# Patient Record
Sex: FEMALE | Race: BLACK OR AFRICAN AMERICAN | Employment: STUDENT | URBAN - METROPOLITAN AREA
[De-identification: names, ages, dates, MRNs, and addresses within clinical notes are randomized per-mention and may not be internally consistent; named-entity substitution may affect disease eponyms.]

---
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
## 2022-11-29 ENCOUNTER — NURSE ONLY (OUTPATIENT)
Dept: PRIMARY CARE CLINIC | Age: 22
End: 2022-11-29

## 2022-11-29 DIAGNOSIS — Z11.1 PPD SCREENING TEST: ICD-10-CM

## 2022-11-29 DIAGNOSIS — Z01.84 IMMUNITY STATUS TESTING: ICD-10-CM

## 2022-11-29 DIAGNOSIS — Z01.84 IMMUNITY STATUS TESTING: Primary | ICD-10-CM

## 2022-11-29 PROCEDURE — 86580 TB INTRADERMAL TEST: CPT | Performed by: NURSE PRACTITIONER

## 2022-11-30 LAB — HBV SURFACE AB TITR SER: NORMAL {TITER}

## 2022-12-01 ENCOUNTER — OFFICE VISIT (OUTPATIENT)
Dept: PRIMARY CARE CLINIC | Age: 22
End: 2022-12-01

## 2022-12-01 DIAGNOSIS — Z11.1 PPD SCREENING TEST: Primary | ICD-10-CM

## 2022-12-01 LAB
INDURATION: NORMAL
MEASLES IMMUNE (IGG): NORMAL
MUMPS AB IGG: NORMAL
RUBELLA ANTIBODY IGG: NORMAL
TB SKIN TEST: NORMAL
VARICELLA-ZOSTER VIRUS AB, IGG: NORMAL

## 2022-12-03 LAB — MISCELLANEOUS LAB TEST RESULT: NORMAL

## 2022-12-13 ENCOUNTER — NURSE ONLY (OUTPATIENT)
Dept: PRIMARY CARE CLINIC | Age: 22
End: 2022-12-13
Payer: COMMERCIAL

## 2022-12-13 DIAGNOSIS — Z23 NEED FOR VARICELLA VACCINE: ICD-10-CM

## 2022-12-13 DIAGNOSIS — Z23 NEED FOR MMR VACCINE: Primary | ICD-10-CM

## 2022-12-13 DIAGNOSIS — Z23 NEED FOR HEPATITIS B VACCINATION: Primary | ICD-10-CM

## 2022-12-13 PROCEDURE — 90739 HEPB VACC 2/4 DOSE ADULT IM: CPT | Performed by: FAMILY MEDICINE

## 2022-12-13 PROCEDURE — 90707 MMR VACCINE SC: CPT | Performed by: FAMILY MEDICINE

## 2022-12-13 PROCEDURE — 90472 IMMUNIZATION ADMIN EACH ADD: CPT | Performed by: FAMILY MEDICINE

## 2022-12-13 PROCEDURE — 90716 VAR VACCINE LIVE SUBQ: CPT | Performed by: FAMILY MEDICINE

## 2022-12-13 PROCEDURE — 90471 IMMUNIZATION ADMIN: CPT | Performed by: FAMILY MEDICINE

## 2023-01-11 ENCOUNTER — NURSE ONLY (OUTPATIENT)
Dept: PRIMARY CARE CLINIC | Age: 23
End: 2023-01-11

## 2023-01-11 ENCOUNTER — NURSE ONLY (OUTPATIENT)
Dept: PRIMARY CARE CLINIC | Age: 23
End: 2023-01-11
Payer: COMMERCIAL

## 2023-01-11 DIAGNOSIS — Z23 NEED FOR MEASLES-MUMPS-RUBELLA (MMR) VACCINE: Primary | ICD-10-CM

## 2023-01-11 DIAGNOSIS — Z23 NEED FOR HEPATITIS B VACCINATION: Primary | ICD-10-CM

## 2023-01-11 PROCEDURE — 90739 HEPB VACC 2/4 DOSE ADULT IM: CPT | Performed by: NURSE PRACTITIONER

## 2023-01-11 PROCEDURE — 90707 MMR VACCINE SC: CPT | Performed by: NURSE PRACTITIONER

## 2023-01-11 PROCEDURE — 90471 IMMUNIZATION ADMIN: CPT | Performed by: NURSE PRACTITIONER

## 2023-01-17 ENCOUNTER — NURSE ONLY (OUTPATIENT)
Dept: PRIMARY CARE CLINIC | Age: 23
End: 2023-01-17
Payer: COMMERCIAL

## 2023-01-17 DIAGNOSIS — Z23 NEED FOR VARICELLA VACCINE: Primary | ICD-10-CM

## 2023-01-17 PROCEDURE — 90471 IMMUNIZATION ADMIN: CPT | Performed by: NURSE PRACTITIONER

## 2023-01-17 PROCEDURE — 90716 VAR VACCINE LIVE SUBQ: CPT | Performed by: NURSE PRACTITIONER

## 2023-02-08 ENCOUNTER — OFFICE VISIT (OUTPATIENT)
Dept: PRIMARY CARE CLINIC | Age: 23
End: 2023-02-08
Payer: COMMERCIAL

## 2023-02-08 VITALS
WEIGHT: 156.2 LBS | SYSTOLIC BLOOD PRESSURE: 120 MMHG | RESPIRATION RATE: 18 BRPM | HEIGHT: 63 IN | OXYGEN SATURATION: 99 % | BODY MASS INDEX: 27.68 KG/M2 | DIASTOLIC BLOOD PRESSURE: 71 MMHG | TEMPERATURE: 97.7 F | HEART RATE: 93 BPM

## 2023-02-08 DIAGNOSIS — J02.9 SORE THROAT: ICD-10-CM

## 2023-02-08 DIAGNOSIS — U07.1 COVID-19: Primary | ICD-10-CM

## 2023-02-08 LAB
INFLUENZA A ANTIBODY: NORMAL
INFLUENZA B ANTIBODY: NORMAL
Lab: ABNORMAL
PERFORMING INSTRUMENT: ABNORMAL
QC PASS/FAIL: ABNORMAL
S PYO AG THROAT QL: NORMAL
SARS-COV-2, POC: DETECTED

## 2023-02-08 PROCEDURE — 87426 SARSCOV CORONAVIRUS AG IA: CPT | Performed by: NURSE PRACTITIONER

## 2023-02-08 PROCEDURE — 87804 INFLUENZA ASSAY W/OPTIC: CPT | Performed by: NURSE PRACTITIONER

## 2023-02-08 PROCEDURE — 99213 OFFICE O/P EST LOW 20 MIN: CPT | Performed by: NURSE PRACTITIONER

## 2023-02-08 PROCEDURE — 87880 STREP A ASSAY W/OPTIC: CPT | Performed by: NURSE PRACTITIONER

## 2023-02-08 NOTE — LETTER
[facility] [place]  [address] [place] [address]  Dept: [phone]  Dept Fax: [phone]: [phone]    KAVON Velazco CNP      2/8/2023     Patient: Bernadette Ferrer   YOB: 2000       To Whom It May Concern: It is my medical opinion that Bernadette Ferrer should remain out of school/work while acutely ill. She did test positive for COVID-19, CDC guidelines recommend 5-day isolation from symptom onset. Patient can return to school/work after 5 days of symptoms if there is no fever for 24 hours without fever reducing medications and resolving symptoms. Pt should continue to wear tight fitting mask for 5 additional days after isolation is over. If you have any questions or concerns, please don't hesitate to call.     Sincerely,        KAVON Velazco CNP

## 2023-02-08 NOTE — PROGRESS NOTES
Chief Complaint   Pharyngitis (Started yesterday, as well as runny nose, fatigue, back pain/body aches & cough. )    History of Present Illness   Source of history provided by:  patient. Jose Acosta is a [address] y.o. old female who presents to walk-in with complaints of Chills, Generalized Body Aches, Pharyngitis, Rhinorrhea, Nasal Congestion, and dry Cough x 1 days. States symptoms have been unchanged since onset. Has been taking Dayquil without symptomatic relief. Currently denies any Fever, Shortness of breath, Nausea, Vomiting, Chest Pain, Abdominal Pain, Rash, Lethargy, or Close contact with a lab confirmed COVID-19 patient within 14 days of symptom onset . Reports hx of sports induced asthma. Denies tobacco use. Patient denies history of COVID-19. Patient is  vaccinated for COVID-19. ROS   Pertinent positives and negatives are stated within HPI, all other systems reviewed and are negative. Past Medical History:  has no past medical history on file. Past Surgical History:  has no past surgical history on file. Social History:  reports that she has been smoking cigarettes. She has never used smokeless tobacco.  Family History: family history is not on file. Allergies: Patient has no known allergies. Physical Exam   Vital Signs:  /71   Pulse 93   Temp 97.7 °F (36.5 °C) (Oral)   Resp 18   Ht 5' 3\" (1.6 m)   Wt 156 lb 3.2 oz (70.9 kg)   LMP 01/25/2023 (Exact Date)   SpO2 99%   BMI 27.67 kg/m²    Oxygen Saturation Interpretation: Normal.    Constitutional:  Alert, development consistent with age. NAD. Head:  NC/NT. Airway patent. Ears: TMs clear bilaterally. Canals without exudate or swelling bilaterally. Mouth: Posterior pharynx with mild erythema and clear postnasal drip. There is no tonsillar hypertrophy or exudate. Neck:  Normal ROM. Supple. There is no anterior cervical adenopathy noted. Lungs: CTAB without wheezes, rales, or rhonchi.    CV:  Regular rate and rhythm, normal heart sounds, without pathological murmurs, ectopy, gallops, or rubs. Skin:  Normal turgor. Warm, dry, without visible rash. Lymphatic: No lymphangitis or adenopathy noted. Neurological:  Oriented. Motor functions intact. Lab / Imaging Results   (All laboratory and radiology results have been personally reviewed by myself)  Labs:  Results for orders placed or performed in visit on 02/08/23   POCT COVID-19, Antigen   Result Value Ref Range    SARS-COV-2, POC Detected (A) Not Detected    Lot Number [id number]     QC Pass/Fail pass     Performing Instrument BinaxNOW    POCT Influenza A/B   Result Value Ref Range    Influenza A Ab NEG     Influenza B Ab NEG    POCT rapid strep A   Result Value Ref Range    Strep A Ag None Detected None Detected     Imaging: All Radiology results interpreted by Radiologist unless otherwise noted. No results found. Medical Decision Making   Pt non-toxic, in no apparent distress and stable at time of discharge. Assessment/Plan   Emelyn Fong was seen today for pharyngitis. Diagnoses and all orders for this visit:    COVID-19  -     POCT COVID-19, Antigen  -     POCT Influenza A/B    Sore throat  -     POCT rapid strep A    Rapid COVID-19 positive in office, pt advised of results. Pt advised of current CDC guidelines regarding isolation. Also advised current criteria to d/c isolation. Increase fluids and rest. Symptomatic relief discussed including Tylenol prn pain/fever. Schedule f/u with PCP in 7-10 days if symptoms persist. ED sooner if symptoms worsen or change. ED immediately with high or refractory fever, progressive SOB, dyspnea, CP, calf pain/swelling, shaking chills, vomiting, abdominal pain, lethargy, flank pain, or decreased urinary output. Pt verbalizes understanding and is in agreement with plan of care. All questions answered. Return if symptoms worsen or fail to improve.      Electronically signed by KAVON Banks CNP   DD: 2/8/23    **This report was transcribed using voice recognition software. Every effort was made to ensure accuracy; however, inadvertent computerized transcription errors may be present.

## 2023-02-26 ENCOUNTER — HOSPITAL ENCOUNTER (EMERGENCY)
Age: 23
Discharge: HOME | End: 2023-02-26
Payer: COMMERCIAL

## 2023-02-26 VITALS
DIASTOLIC BLOOD PRESSURE: 73 MMHG | RESPIRATION RATE: 16 BRPM | OXYGEN SATURATION: 100 % | SYSTOLIC BLOOD PRESSURE: 112 MMHG | HEART RATE: 88 BPM | TEMPERATURE: 97.6 F

## 2023-02-26 DIAGNOSIS — J02.0 STREP PHARYNGITIS: Primary | ICD-10-CM

## 2023-02-26 LAB — STREP GRP A PCR: POSITIVE

## 2023-02-26 PROCEDURE — 87880 STREP A ASSAY W/OPTIC: CPT

## 2023-02-26 PROCEDURE — 99283 EMERGENCY DEPT VISIT LOW MDM: CPT

## 2023-02-26 RX ORDER — AMOXICILLIN 500 MG/1
500 CAPSULE ORAL 3 TIMES DAILY
Qty: 21 CAPSULE | Refills: 0 | Status: SHIPPED | OUTPATIENT
Start: 2023-02-26 | End: 2023-03-05

## 2023-02-26 NOTE — ED PROVIDER NOTES
Independent [facility] Visit. HPI: Howard Miranda is a 23 y.o. female with a past medical history of  has no past medical history on file. presenting with complaints of a sore throat The patient states that these symptoms began gradually. The history is obtained from the patient. The patient states that he has had some subjective chills at home. Patient does complain of a mild cough associated with it that is nonproductive. Patient denies excessive fatigue or sleeping greater than 18 hours a day. Patient denies exposure to mononucleosis. The patient denies any abdominal pain, left upper quadrant fullness, or early satiety. The patient also denies difficulty breathing, hemoptysis, neck pain/stiffness, or blurry vision. Sx have persisted and are mildly worse which is what prompted the visit today. unknown exposure to sick contacts. Presents for complaints of a sore throat which she states began 3 days ago. Denies any fever, body aches or chills. Denies any chest pain or shortness of breath. Has a able to tolerate oral fluids without difficulty. ROS:   Pertinent positives and negatives are stated within HPI, all other systems reviewed and are negative.      --------------------------------------------- PAST HISTORY ---------------------------------------------  Past Medical History:  has no past medical history on file. Past Surgical History:  has no past surgical history on file. Social History:  reports that she has been smoking cigarettes. She has never used smokeless tobacco.    Family History: family history is not on file. The patients home medications have been reviewed. Allergies: Patient has no known allergies. ------------------------- NURSING NOTES AND VITALS REVIEWED ---------------------------   The nursing notes within the ED encounter and vital signs as below have been reviewed by myself.   /73   Pulse 88   Temp 97.6 °F (36.4 °C)   Resp 16   SpO2 100%   Oxygen Saturation Interpretation: Normal    The patients available past medical records and past encounters were reviewed. Physical exam:  Constitutional: Vital signs are reviewed the patient is comfortable. The patient is alert and oriented and conversant. Head: The head is atraumatic and normocephalic. Eyes: No discharge is present from the eyes. The sclera are normal.  ENT: The oropharynx demonstrates a small amount of erythema bilaterally. There is no tonsillar enlargement nor is there any exudate present. No uvular deviation or edema. No tonsillary asymmetry. Floor of the mouth soft, no trismus, handling secretions. TMs bilaterally demonstrate no evidence of infection. Neck: Normal range of motion is achieved in the neck. There is no JVD present. No meningeal signs are present   Anterior cervical adenopathy is normal.  Respiratory/chest: The chest is nontender. Breath sounds are normal. There is no respiratory distress. Cardiovascular: Heart shows a regular rate and rhythm without murmurs clicks or gallops. Abdominal exam: The abdomen is non tender without evidence of peritoneal signs. Specific attention to the left upper quadrant with palpation of the spleen demonstrates no organomegaly or tenderness  Skin: warm and dry, without rash  Neurologic: GCS 15   Psych: Normal Affect  -------------------------------------------------- RESULTS -------------------------------------------------    LABS:  Results for orders placed or performed during the hospital encounter of 02/26/23   Strep Screen Group A Throat    Specimen: Throat   Result Value Ref Range    Strep Grp A PCR POSITIVE Negative       RADIOLOGY:  Interpreted by Radiologist.  No orders to display         ------------------------------ ED COURSE/MEDICAL DECISION MAKING----------------------  Medications - No data to display          Medical Decision Making:        Presents for complaints of a sore throat which she states began 3 days ago.   Denies any fever, body aches or chills. Denies any chest pain or shortness of breath. Has a able to tolerate oral fluids without difficulty. Strep is positive will be treated with antibiotics patient advised to continue oral hydration and follow-up with PCP if any change or worsening symptoms return back to the emergency room. Not hypoxic, nothing to suggests pneumonia. Well appearing, non toxic, appropriate for outpatient management. Plan is for symptom management and PCP follow up. This patient's ED course included: a personal history and physicial eaxmination and re-evaluation prior to disposition    This patient has remained hemodynamically stable during their ED course. Counseling: The emergency provider has spoken with the patient and discussed todays results, in addition to providing specific details for the plan of care and counseling regarding the diagnosis and prognosis. Questions are answered at this time and they are agreeable with the plan.       --------------------------------- IMPRESSION AND DISPOSITION ---------------------------------    IMPRESSION  1.  Strep pharyngitis        DISPOSITION  Disposition: Discharge to home  Patient condition is good             KAVON Byrd CNP  02/26/23 [id number]

## 2023-02-26 NOTE — Clinical Note
Glenn Caro was seen and treated in our emergency department on 2/26/2023. She may return to work on 03/01/2023. If you have any questions or concerns, please don't hesitate to call.       Monique Menard, APRN - CNP

## 2023-03-02 ENCOUNTER — NURSE ONLY (OUTPATIENT)
Dept: PRIMARY CARE CLINIC | Age: 23
End: 2023-03-02

## 2023-03-02 DIAGNOSIS — Z01.84 IMMUNITY STATUS TESTING: ICD-10-CM

## 2023-03-02 DIAGNOSIS — Z01.84 IMMUNITY STATUS TESTING: Primary | ICD-10-CM

## 2023-03-02 PROCEDURE — 36415 COLL VENOUS BLD VENIPUNCTURE: CPT | Performed by: FAMILY MEDICINE

## 2023-03-03 LAB
HBV SURFACE AB TITR SER: REACTIVE {TITER}
VARICELLA-ZOSTER VIRUS AB, IGG: NORMAL

## 2023-03-06 LAB
MEASLES IMMUNE (IGG): NORMAL
MUMPS AB IGG: NORMAL
RUBELLA ANTIBODY IGG: NORMAL

## 2023-04-05 ENCOUNTER — OFFICE VISIT (OUTPATIENT)
Dept: PRIMARY CARE CLINIC | Age: 23
End: 2023-04-05

## 2023-04-05 VITALS
WEIGHT: 158 LBS | DIASTOLIC BLOOD PRESSURE: 69 MMHG | BODY MASS INDEX: 28 KG/M2 | HEIGHT: 63 IN | SYSTOLIC BLOOD PRESSURE: 110 MMHG | HEART RATE: 80 BPM | RESPIRATION RATE: 20 BRPM | TEMPERATURE: 97.8 F | OXYGEN SATURATION: 99 %

## 2023-04-05 DIAGNOSIS — R39.9 SYMPTOMS OF URINARY TRACT INFECTION: Primary | ICD-10-CM

## 2023-04-05 DIAGNOSIS — R39.9 SYMPTOMS OF URINARY TRACT INFECTION: ICD-10-CM

## 2023-04-05 LAB
BILIRUBIN, POC: NEGATIVE
BLOOD URINE, POC: NEGATIVE
CLARITY, POC: NORMAL
COLOR, POC: YELLOW
GLUCOSE URINE, POC: NEGATIVE
KETONES, POC: NORMAL
LEUKOCYTE EST, POC: NORMAL
NITRITE, POC: NEGATIVE
PH, POC: 6
PROTEIN, POC: NORMAL
SPECIFIC GRAVITY, POC: 1.03
UROBILINOGEN, POC: NORMAL

## 2023-04-05 PROCEDURE — 81002 URINALYSIS NONAUTO W/O SCOPE: CPT | Performed by: NURSE PRACTITIONER

## 2023-04-05 PROCEDURE — 99213 OFFICE O/P EST LOW 20 MIN: CPT | Performed by: NURSE PRACTITIONER

## 2023-04-05 RX ORDER — NITROFURANTOIN 25; 75 MG/1; MG/1
100 CAPSULE ORAL 2 TIMES DAILY
Qty: 10 CAPSULE | Refills: 0 | Status: SHIPPED | OUTPATIENT
Start: 2023-04-05 | End: 2023-04-10

## 2023-04-05 NOTE — PROGRESS NOTES
as wait and watch, side effects discussed. Increase fluids and rest. F/u PCP in 3-5 days if symptoms persist. ED sooner if symptoms worsen or change. ED immediately with the development of fever, shaking chills, body aches, flank pain, vomiting, CP, or SOB. Pt is in agreement with this care plan. All questions answered. Return if symptoms worsen or fail to improve. Electronically signed by KAVON Shearer CNP   DD: 4/5/23    **This report was transcribed using voice recognition software. Every effort was made to ensure accuracy; however, inadvertent computerized transcription errors may be present.

## 2023-04-07 ENCOUNTER — OFFICE VISIT (OUTPATIENT)
Dept: PRIMARY CARE CLINIC | Age: 23
End: 2023-04-07

## 2023-04-07 VITALS
OXYGEN SATURATION: 97 % | HEIGHT: 63 IN | DIASTOLIC BLOOD PRESSURE: 74 MMHG | TEMPERATURE: 99 F | RESPIRATION RATE: 20 BRPM | BODY MASS INDEX: 28 KG/M2 | SYSTOLIC BLOOD PRESSURE: 114 MMHG | HEART RATE: 98 BPM | WEIGHT: 158 LBS

## 2023-04-07 DIAGNOSIS — Z20.2 POSSIBLE EXPOSURE TO STD: Primary | ICD-10-CM

## 2023-04-07 DIAGNOSIS — R10.2 VAGINAL PAIN: ICD-10-CM

## 2023-04-07 DIAGNOSIS — Z20.2 POSSIBLE EXPOSURE TO STD: ICD-10-CM

## 2023-04-07 LAB
BILIRUBIN, POC: NEGATIVE
BLOOD URINE, POC: NORMAL
CLARITY, POC: NORMAL
COLOR, POC: YELLOW
GLUCOSE URINE, POC: NEGATIVE
KETONES, POC: NEGATIVE
LEUKOCYTE EST, POC: NEGATIVE
NITRITE, POC: NEGATIVE
PH, POC: 6
PROTEIN, POC: NEGATIVE
SPECIFIC GRAVITY, POC: 1.02
UROBILINOGEN, POC: NORMAL

## 2023-04-07 RX ORDER — AZITHROMYCIN 250 MG/1
1000 TABLET, FILM COATED ORAL ONCE
Status: COMPLETED | OUTPATIENT
Start: 2023-04-07 | End: 2023-04-07

## 2023-04-07 RX ORDER — METRONIDAZOLE 500 MG/1
500 TABLET ORAL 2 TIMES DAILY
Qty: 14 TABLET | Refills: 0 | Status: SHIPPED | OUTPATIENT
Start: 2023-04-07 | End: 2023-04-14

## 2023-04-07 RX ORDER — CEFTRIAXONE 500 MG/1
500 INJECTION, POWDER, FOR SOLUTION INTRAMUSCULAR; INTRAVENOUS ONCE
Status: COMPLETED | OUTPATIENT
Start: 2023-04-07 | End: 2023-04-07

## 2023-04-07 RX ADMIN — AZITHROMYCIN 1000 MG: 250 TABLET, FILM COATED ORAL at 15:46

## 2023-04-07 RX ADMIN — CEFTRIAXONE 500 MG: 500 INJECTION, POWDER, FOR SOLUTION INTRAMUSCULAR; INTRAVENOUS at 15:55

## 2023-04-07 NOTE — PROGRESS NOTES
Chief Complaint:   Urinary Tract Infection (Patient states she returned to office d/t her symptoms worsening. States she is now experiencing constant vaginal pain even while sitting. States she has been taking the Macrobid. )      History of Present Illness   Source of history provided by:  patient      Lamar Bruce is a 23 y.o. old female presenting to walk-in for concerns for sexually transmitted infection, which occurred prior to arrival.  Since onset the symptoms have been constant and mild-moderate in severity. Symptoms are associated with  burning on her labia's when she urinates  and denies any abdominal pain, urinary frequency, or vaginal itching. STD History: no history of PID, STD's. LMP: On BC and does not have them   Current pregnancy: No.     Birth Control: OCP (estrogen/progesterone). Gravid Status:   0 , Para 0 , Misc/ 0. Review of Systems    Unless otherwise stated in this report or unable to obtain because of the patient's clinical or mental status as evidenced by the medical record, this patients's positive and negative responses for Review of Systems, constitutional, psych, eyes, ENT, cardiovascular, respiratory, gastrointestinal, neurological, genitourinary, musculoskeletal, integument systems and systems related to the presenting problem are either stated in the preceding or were not pertinent or were negative for the symptoms and/or complaints related to the medical problem. Past Medical History:  has no past medical history on file. Past Surgical History:  has no past surgical history on file. Social History:  reports that she has never smoked. She has never used smokeless tobacco. She reports that she does not currently use alcohol. She reports that she does not currently use drugs. Family History: family history is not on file. Allergies: Patient has no known allergies.     Physical Exam   Vital Signs:  /74   Pulse 98   Temp 99 °F (37.2 °C)

## 2023-04-08 LAB — BACTERIA UR CULT: NORMAL

## 2023-04-11 DIAGNOSIS — Z20.2 POSSIBLE EXPOSURE TO STD: ICD-10-CM

## 2023-04-11 LAB
BACTERIA GENITAL AEROBE CULT: ABNORMAL
ORGANISM: ABNORMAL
ORGANISM: ABNORMAL

## 2023-04-12 LAB
C TRACH DNA SPEC QL NAA+PROBE: NEGATIVE
N GONORRHOEA DNA SPEC QL NAA+PROBE: NEGATIVE
SPECIMEN SOURCE: NORMAL

## 2023-04-24 ENCOUNTER — OFFICE VISIT (OUTPATIENT)
Dept: PRIMARY CARE CLINIC | Age: 23
End: 2023-04-24

## 2023-04-24 VITALS
DIASTOLIC BLOOD PRESSURE: 80 MMHG | SYSTOLIC BLOOD PRESSURE: 104 MMHG | HEIGHT: 63 IN | WEIGHT: 159 LBS | TEMPERATURE: 98.1 F | OXYGEN SATURATION: 98 % | BODY MASS INDEX: 28.17 KG/M2 | HEART RATE: 82 BPM | RESPIRATION RATE: 16 BRPM

## 2023-04-24 DIAGNOSIS — B34.9 VIRAL ILLNESS: Primary | ICD-10-CM

## 2023-04-24 DIAGNOSIS — J02.9 SORE THROAT: ICD-10-CM

## 2023-04-24 LAB
INFLUENZA A ANTIBODY: NEGATIVE
INFLUENZA B ANTIBODY: NEGATIVE
Lab: NORMAL
PERFORMING INSTRUMENT: NORMAL
QC PASS/FAIL: NORMAL
S PYO AG THROAT QL: NORMAL
SARS-COV-2, POC: NORMAL

## 2023-04-24 PROCEDURE — 87880 STREP A ASSAY W/OPTIC: CPT | Performed by: NURSE PRACTITIONER

## 2023-04-24 PROCEDURE — 99214 OFFICE O/P EST MOD 30 MIN: CPT | Performed by: NURSE PRACTITIONER

## 2023-04-24 PROCEDURE — 87426 SARSCOV CORONAVIRUS AG IA: CPT | Performed by: NURSE PRACTITIONER

## 2023-04-24 PROCEDURE — 87804 INFLUENZA ASSAY W/OPTIC: CPT | Performed by: NURSE PRACTITIONER

## 2023-04-24 RX ORDER — VALACYCLOVIR HYDROCHLORIDE 1 G/1
1000 TABLET, FILM COATED ORAL 2 TIMES DAILY
COMMUNITY
Start: 2023-04-13

## 2023-04-24 RX ORDER — DOXYCYCLINE HYCLATE 100 MG
TABLET ORAL
COMMUNITY
Start: 2023-04-21

## 2023-04-24 RX ORDER — METHYLPREDNISOLONE 4 MG/1
TABLET ORAL
Qty: 1 KIT | Refills: 0 | Status: SHIPPED | OUTPATIENT
Start: 2023-04-24

## 2023-04-24 RX ORDER — SERTRALINE HYDROCHLORIDE 25 MG/1
25 TABLET, FILM COATED ORAL DAILY
COMMUNITY
Start: 2019-11-27

## 2023-04-24 RX ORDER — AMOXICILLIN 500 MG/1
500 CAPSULE ORAL 3 TIMES DAILY
Qty: 21 CAPSULE | Refills: 0 | Status: SHIPPED | OUTPATIENT
Start: 2023-04-24 | End: 2023-05-01

## 2023-04-24 NOTE — PROGRESS NOTES
times daily for 7 days    Rapid strep, influenza and COVID-19 came back negative. Pt advised that illness is likely viral and should resolve with time and conservative measures. Script written for Medrol Dosepak and amoxicillin the patient will hold and take in 3 to 4 days if symptoms do not improve, side effects discussed. Increase fluids and rest. NSAIDs prn pain/fever. F/u PCP in 5-7 days if symptoms persist. ED sooner if symptoms worsen or change. ED immediately with the development of refractory fever, shaking chills, dyspnea, dysphagia, neck stiffness, vomiting, etc. Pt is in agreement with this care plan. All questions answered. Return if symptoms worsen or fail to improve. Electronically signed by KAVON Rey CNP   DD: 4/24/23    **This report was transcribed using voice recognition software. Every effort was made to ensure accuracy; however, inadvertent computerized transcription errors may be present.

## 2023-11-06 ENCOUNTER — OFFICE VISIT (OUTPATIENT)
Dept: PRIMARY CARE CLINIC | Age: 23
End: 2023-11-06

## 2023-11-06 VITALS
OXYGEN SATURATION: 98 % | BODY MASS INDEX: 26.58 KG/M2 | RESPIRATION RATE: 18 BRPM | DIASTOLIC BLOOD PRESSURE: 82 MMHG | WEIGHT: 150 LBS | HEART RATE: 76 BPM | HEIGHT: 63 IN | TEMPERATURE: 99 F | SYSTOLIC BLOOD PRESSURE: 108 MMHG

## 2023-11-06 DIAGNOSIS — J30.9 ALLERGIC RHINITIS, UNSPECIFIED SEASONALITY, UNSPECIFIED TRIGGER: ICD-10-CM

## 2023-11-06 DIAGNOSIS — J02.9 SORE THROAT: Primary | ICD-10-CM

## 2023-11-06 LAB — S PYO AG THROAT QL: NORMAL

## 2023-11-06 PROCEDURE — 99213 OFFICE O/P EST LOW 20 MIN: CPT | Performed by: FAMILY MEDICINE

## 2023-11-06 PROCEDURE — 87880 STREP A ASSAY W/OPTIC: CPT | Performed by: FAMILY MEDICINE

## 2023-11-06 RX ORDER — FLUTICASONE PROPIONATE 50 MCG
2 SPRAY, SUSPENSION (ML) NASAL DAILY
Qty: 48 G | Refills: 0 | COMMUNITY
Start: 2023-11-06

## 2023-11-06 ASSESSMENT — ENCOUNTER SYMPTOMS
NAUSEA: 0
CONSTIPATION: 0
DIARRHEA: 0
COUGH: 0
ABDOMINAL PAIN: 0
SHORTNESS OF BREATH: 0
SORE THROAT: 1
VOMITING: 0
WHEEZING: 0

## 2023-11-13 ASSESSMENT — ENCOUNTER SYMPTOMS: BLOOD IN STOOL: 0

## 2024-01-10 ENCOUNTER — NURSE ONLY (OUTPATIENT)
Dept: PRIMARY CARE CLINIC | Age: 24
End: 2024-01-10

## 2024-01-10 DIAGNOSIS — Z11.1 PPD SCREENING TEST: Primary | ICD-10-CM

## 2024-01-10 PROCEDURE — 86580 TB INTRADERMAL TEST: CPT | Performed by: FAMILY MEDICINE

## 2024-01-12 ENCOUNTER — NURSE ONLY (OUTPATIENT)
Dept: PRIMARY CARE CLINIC | Age: 24
End: 2024-01-12

## 2024-01-12 LAB
INDURATION: NORMAL
TB SKIN TEST: NEGATIVE

## 2024-01-19 ENCOUNTER — OFFICE VISIT (OUTPATIENT)
Dept: PRIMARY CARE CLINIC | Age: 24
End: 2024-01-19

## 2024-01-19 VITALS
OXYGEN SATURATION: 99 % | HEART RATE: 86 BPM | BODY MASS INDEX: 26.22 KG/M2 | DIASTOLIC BLOOD PRESSURE: 75 MMHG | HEIGHT: 63 IN | SYSTOLIC BLOOD PRESSURE: 124 MMHG | TEMPERATURE: 97.7 F | WEIGHT: 148 LBS | RESPIRATION RATE: 17 BRPM

## 2024-01-19 DIAGNOSIS — J20.9 ACUTE BRONCHITIS, UNSPECIFIED ORGANISM: Primary | ICD-10-CM

## 2024-01-19 RX ORDER — AZITHROMYCIN 250 MG/1
250 TABLET, FILM COATED ORAL SEE ADMIN INSTRUCTIONS
Qty: 6 TABLET | Refills: 0 | Status: SHIPPED | OUTPATIENT
Start: 2024-01-19 | End: 2024-01-24

## 2024-01-19 RX ORDER — BENZONATATE 100 MG/1
100 CAPSULE ORAL 3 TIMES DAILY PRN
Qty: 30 CAPSULE | Refills: 0 | Status: SHIPPED | OUTPATIENT
Start: 2024-01-19 | End: 2024-01-29

## 2024-01-19 RX ORDER — CEFDINIR 300 MG/1
300 CAPSULE ORAL 2 TIMES DAILY
Qty: 14 CAPSULE | Refills: 0 | Status: CANCELLED | OUTPATIENT
Start: 2024-01-19 | End: 2024-01-26

## 2024-01-19 NOTE — PROGRESS NOTES
2024     Carolyn Amaya 23 y.o. female    : 2000   Chief Complaint:   Head Congestion (Coughing up yellow mucus,  started 24, needs note to return to school)      History of Present Illness   Source of history provided by:  patient.    Carolyn Amaya is a 23 y.o. old female who presents to walk-in for evaluation of cough x 10 days. Associated symptoms include head congestion.  Since onset symptoms have been consistent.  Patient has had no known Covid 19 exposure.  Patient has not been diagnosed with COVID-19 in the last 90 days.  Has taken some OTC medications at home with some symptomatic relief. Denies any fever, chills, CP, dyspnea, LE edema, abdominal pain, nausea, vomiting, rash, dizziness, or lethargy. Denies any history of asthma, pneumonia, recurrent bronchitis or COPD.  They have no history of tobacco abuse.        ROS   Past Medical History: History reviewed. No pertinent past medical history.  Past Surgical History:  has no past surgical history on file.  Social History:  reports that she has never smoked. She has never used smokeless tobacco. She reports that she does not currently use alcohol. She reports that she does not currently use drugs.  Family History: family history is not on file.   Allergies: Patient has no known allergies.    Unless otherwise stated in this report the patient's positive and negative responses for review of systems for constitutional, eyes, ENT, cardiovascular, respiratory, gastrointestinal, neurological, , musculoskeletal, and integument systems and related systems to the presenting problem are either stated in the history of present illness or were not pertinent or were negative for the symptoms and/or complaints related to the presenting medical problem.  Positives and pertinent negatives as per HPI.  All others reviewed and are negative.    Physical Exam   VS:    Vitals:    24 1300   BP: 124/75   Pulse: 86   Resp: 17   Temp: 97.7 °F

## 2024-03-22 ENCOUNTER — OFFICE VISIT (OUTPATIENT)
Dept: PRIMARY CARE CLINIC | Age: 24
End: 2024-03-22

## 2024-03-22 VITALS
OXYGEN SATURATION: 93 % | HEIGHT: 63 IN | RESPIRATION RATE: 16 BRPM | DIASTOLIC BLOOD PRESSURE: 64 MMHG | HEART RATE: 67 BPM | TEMPERATURE: 97.4 F | WEIGHT: 147 LBS | SYSTOLIC BLOOD PRESSURE: 104 MMHG | BODY MASS INDEX: 26.05 KG/M2

## 2024-03-22 DIAGNOSIS — J02.9 SORE THROAT: ICD-10-CM

## 2024-03-22 DIAGNOSIS — J35.8 TONSILLAR CALCULUS: Primary | ICD-10-CM

## 2024-03-22 LAB — S PYO AG THROAT QL: NORMAL

## 2024-03-22 ASSESSMENT — ENCOUNTER SYMPTOMS
EYE REDNESS: 0
WHEEZING: 0
EYE PAIN: 0
SHORTNESS OF BREATH: 0
NAUSEA: 0
BACK PAIN: 0
SORE THROAT: 1
ABDOMINAL DISTENTION: 0
DIARRHEA: 0
SINUS PRESSURE: 0
COUGH: 0
VOMITING: 0
EYE DISCHARGE: 0

## 2024-03-22 NOTE — PROGRESS NOTES
Chief Complaint:   Pharyngitis (Sore throat since last Friday but she had her wisdom teeth taking out an couple days later. She saw white stuff on her tonsils)      History of Present Illness   HPI:  Carolyn Amaya is a 23 y.o. female who presents to [facility] today for sent by Dental Hygiene professor, concern for STREPT THROAT    Prior to Visit Medications    Not on File       Review of Systems   Review of Systems   Constitutional:  Negative for chills and fever.   HENT:  Positive for sore throat. Negative for ear pain and sinus pressure.    Eyes:  Negative for pain, discharge and redness.   Respiratory:  Negative for cough, shortness of breath and wheezing.    Cardiovascular:  Negative for chest pain.   Gastrointestinal:  Negative for abdominal distention, diarrhea, nausea and vomiting.   Genitourinary:  Negative for dysuria and frequency.   Musculoskeletal:  Negative for arthralgias and back pain.   Skin:  Negative for rash and wound.   Neurological:  Negative for weakness and headaches.   Hematological:  Negative for adenopathy.   Psychiatric/Behavioral: Negative.     All other systems reviewed and are negative.      Patient's medical, social, and family history reviewed    Past Medical History:  has no past medical history on file.   Past Surgical History:  has no past surgical history on file.  Social History:  reports that she has never smoked. She has never used smokeless tobacco. She reports that she does not currently use alcohol. She reports that she does not currently use drugs.  Family History: family history is not on file.  Allergies: Patient has no known allergies.    Physical Exam   Vital Signs:  /64   Pulse 67   Temp 97.4 °F (36.3 °C) (Oral)   Resp 16   Ht 1.6 m (5' 3\")   Wt 66.7 kg (147 lb)   SpO2 93%   BMI 26.04 kg/m²    Oxygen Saturation Interpretation: Normal.    Physical Exam  Vitals and nursing note reviewed.   Constitutional:       Appearance: She is well-developed.

## 2024-04-16 ENCOUNTER — OFFICE VISIT (OUTPATIENT)
Dept: PRIMARY CARE CLINIC | Age: 24
End: 2024-04-16

## 2024-04-16 VITALS
WEIGHT: 148 LBS | RESPIRATION RATE: 16 BRPM | DIASTOLIC BLOOD PRESSURE: 68 MMHG | SYSTOLIC BLOOD PRESSURE: 103 MMHG | TEMPERATURE: 97.4 F | BODY MASS INDEX: 27.23 KG/M2 | HEART RATE: 57 BPM | OXYGEN SATURATION: 98 % | HEIGHT: 62 IN

## 2024-04-16 DIAGNOSIS — B34.9 VIRAL ILLNESS: Primary | ICD-10-CM

## 2024-04-16 DIAGNOSIS — J02.9 SORE THROAT: ICD-10-CM

## 2024-04-16 LAB — S PYO AG THROAT QL: NORMAL

## 2024-04-16 PROCEDURE — 99213 OFFICE O/P EST LOW 20 MIN: CPT | Performed by: NURSE PRACTITIONER

## 2024-04-16 PROCEDURE — 87880 STREP A ASSAY W/OPTIC: CPT | Performed by: NURSE PRACTITIONER

## 2024-04-16 NOTE — PROGRESS NOTES
Chief Complaint:   Pharyngitis (Statred yesterday, swallowing hurts, and cold but has an issue with finances )    History of Present Illness   Source of history provided by:  patient.     Carolyn Amaya is a 23 y.o. old female who presents to walk-in for sore throat.  Pt states sore throat began 1 days ago. Reports associated post nasal drainage.  Denies any fever, nausea, vomiting, abdominal pain, CP, SOB, cough, or lethargy.  Has been taking Xycam for the symptoms without any relief. Denies any known sick contacts.          Exposed To:  Streptococcus: no.                             Infectious Mononucleosis: no.      COVID-19: no.    Review of Systems   Unless otherwise stated in this report or unable to obtain because of the patient's clinical or mental status as evidenced by the medical record, this patients's positive and negative responses for Review of Systems, constitutional, psych, eyes, ENT, cardiovascular, respiratory, gastrointestinal, neurological, genitourinary, musculoskeletal, integument systems and systems related to the presenting problem are either stated in the preceding or were not pertinent or were negative for the symptoms and/or complaints related to the medical problem.    Past Medical History:  has no past medical history on file.   Past Surgical History:  has no past surgical history on file.  Social History:  reports that she has never smoked. She has never used smokeless tobacco. She reports that she does not currently use alcohol. She reports that she does not currently use drugs.  Family History: family history is not on file.  Allergies: Patient has no known allergies.    Physical Exam   Vital Signs:  /68   Pulse 57   Temp 97.4 °F (36.3 °C) (Oral)   Resp 16   Ht 1.575 m (5' 2\")   Wt 67.1 kg (148 lb)   SpO2 98%   BMI 27.07 kg/m²    Oxygen Saturation Interpretation: Normal.    Constitutional:  Alert, development consistent with age.  Ears:  TMs without perforation,

## 2024-09-28 ENCOUNTER — HOSPITAL ENCOUNTER (EMERGENCY)
Age: 24
Discharge: HOME | End: 2024-09-28

## 2024-11-06 ENCOUNTER — OFFICE VISIT (OUTPATIENT)
Dept: PRIMARY CARE CLINIC | Age: 24
End: 2024-11-06

## 2024-11-06 VITALS
HEIGHT: 63 IN | BODY MASS INDEX: 28.35 KG/M2 | TEMPERATURE: 98.3 F | HEART RATE: 85 BPM | DIASTOLIC BLOOD PRESSURE: 73 MMHG | SYSTOLIC BLOOD PRESSURE: 113 MMHG | RESPIRATION RATE: 16 BRPM | WEIGHT: 160 LBS | OXYGEN SATURATION: 98 %

## 2024-11-06 DIAGNOSIS — J02.9 SORE THROAT: ICD-10-CM

## 2024-11-06 DIAGNOSIS — B34.9 VIRAL ILLNESS: Primary | ICD-10-CM

## 2024-11-06 LAB — S PYO AG THROAT QL: NORMAL

## 2024-11-06 PROCEDURE — 87880 STREP A ASSAY W/OPTIC: CPT | Performed by: NURSE PRACTITIONER

## 2024-11-06 PROCEDURE — 99213 OFFICE O/P EST LOW 20 MIN: CPT | Performed by: NURSE PRACTITIONER

## 2024-11-06 SDOH — ECONOMIC STABILITY: INCOME INSECURITY: HOW HARD IS IT FOR YOU TO PAY FOR THE VERY BASICS LIKE FOOD, HOUSING, MEDICAL CARE, AND HEATING?: NOT HARD AT ALL

## 2024-11-06 SDOH — ECONOMIC STABILITY: FOOD INSECURITY: WITHIN THE PAST 12 MONTHS, THE FOOD YOU BOUGHT JUST DIDN'T LAST AND YOU DIDN'T HAVE MONEY TO GET MORE.: NEVER TRUE

## 2024-11-06 SDOH — ECONOMIC STABILITY: FOOD INSECURITY: WITHIN THE PAST 12 MONTHS, YOU WORRIED THAT YOUR FOOD WOULD RUN OUT BEFORE YOU GOT MONEY TO BUY MORE.: NEVER TRUE

## 2024-11-06 NOTE — PROGRESS NOTES
Chief Complaint:   Pharyngitis (Started yesterday morning with sore throat, body aches, sweats/chills, fatigue. OTC Cold/flu no help. )    History of Present Illness   Source of history provided by:  patient.     Carolyn Amaya is a 24 y.o. old female who presents to walk-in for sore throat.  Pt states sore throat began 1 days ago. Reports associated chills, sweats, fatigue and body aches. Denies any fever, nausea, vomiting, abdominal pain, CP, SOB, cough, or lethargy.  Has been taking flu and cold for the symptoms without any relief. There has been known sick contacts.          Exposed To:  Streptococcus: no.                             Infectious Mononucleosis: no.      COVID-19: no.    Review of Systems   Unless otherwise stated in this report or unable to obtain because of the patient's clinical or mental status as evidenced by the medical record, this patients's positive and negative responses for Review of Systems, constitutional, psych, eyes, ENT, cardiovascular, respiratory, gastrointestinal, neurological, genitourinary, musculoskeletal, integument systems and systems related to the presenting problem are either stated in the preceding or were not pertinent or were negative for the symptoms and/or complaints related to the medical problem.    Past Medical History:  has no past medical history on file.   Past Surgical History:  has no past surgical history on file.  Social History:  reports that she has never smoked. She has never used smokeless tobacco. She reports that she does not currently use alcohol. She reports that she does not currently use drugs.  Family History: family history is not on file.  Allergies: Patient has no known allergies.    Physical Exam   Vital Signs:  /73 (Site: Right Upper Arm, Position: Sitting, Cuff Size: Medium Adult)   Pulse 85   Temp 98.3 °F (36.8 °C) (Oral)   Resp 16   Ht 1.6 m (5' 3\")   Wt 72.6 kg (160 lb)   LMP 10/19/2024   SpO2 98%   BMI 28.34 kg/m²

## 2025-07-20 ENCOUNTER — HOSPITAL ENCOUNTER (EMERGENCY)
Age: 25
Discharge: LEFT BEFORE DISCHARGE | End: 2025-07-20
Payer: COMMERCIAL

## 2025-07-20 VITALS
WEIGHT: 160 LBS | OXYGEN SATURATION: 100 % | HEIGHT: 63 IN | RESPIRATION RATE: 16 BRPM | HEART RATE: 79 BPM | SYSTOLIC BLOOD PRESSURE: 117 MMHG | DIASTOLIC BLOOD PRESSURE: 60 MMHG | BODY MASS INDEX: 28.35 KG/M2 | TEMPERATURE: 97.5 F

## 2025-07-20 DIAGNOSIS — Z53.21 ELOPED FROM EMERGENCY DEPARTMENT: ICD-10-CM

## 2025-07-20 DIAGNOSIS — J02.9 ACUTE PHARYNGITIS, UNSPECIFIED ETIOLOGY: Primary | ICD-10-CM

## 2025-07-20 LAB
SPECIMEN SOURCE: NORMAL
STREP A, MOLECULAR: NEGATIVE

## 2025-07-20 PROCEDURE — 99284 EMERGENCY DEPT VISIT MOD MDM: CPT

## 2025-07-20 PROCEDURE — 96372 THER/PROPH/DIAG INJ SC/IM: CPT

## 2025-07-20 PROCEDURE — 6360000002 HC RX W HCPCS: Performed by: NURSE PRACTITIONER

## 2025-07-20 PROCEDURE — 87651 STREP A DNA AMP PROBE: CPT

## 2025-07-20 RX ORDER — KETOROLAC TROMETHAMINE 30 MG/ML
30 INJECTION, SOLUTION INTRAMUSCULAR; INTRAVENOUS ONCE
Status: COMPLETED | OUTPATIENT
Start: 2025-07-20 | End: 2025-07-20

## 2025-07-20 RX ADMIN — KETOROLAC TROMETHAMINE 30 MG: 30 INJECTION, SOLUTION INTRAMUSCULAR at 01:55

## 2025-07-20 ASSESSMENT — PAIN - FUNCTIONAL ASSESSMENT: PAIN_FUNCTIONAL_ASSESSMENT: 0-10

## 2025-07-20 ASSESSMENT — PAIN SCALES - GENERAL
PAINLEVEL_OUTOF10: 7
PAINLEVEL_OUTOF10: 6

## 2025-07-20 ASSESSMENT — PAIN DESCRIPTION - DESCRIPTORS
DESCRIPTORS: ACHING;DISCOMFORT
DESCRIPTORS: ACHING

## 2025-07-20 ASSESSMENT — PAIN DESCRIPTION - LOCATION
LOCATION: THROAT
LOCATION: THROAT

## 2025-07-20 ASSESSMENT — LIFESTYLE VARIABLES
HOW OFTEN DO YOU HAVE A DRINK CONTAINING ALCOHOL: NEVER
HOW MANY STANDARD DRINKS CONTAINING ALCOHOL DO YOU HAVE ON A TYPICAL DAY: PATIENT DOES NOT DRINK

## 2025-07-20 ASSESSMENT — PAIN DESCRIPTION - PAIN TYPE: TYPE: ACUTE PAIN

## 2025-07-20 NOTE — ED PROVIDER NOTES
Independent [facility] Visit.       [facility] EMERGENCY DEPARTMENT  ED  Encounter Note  Admit Date/RoomTime: 2025  2:41 AM  ED Room: PR1/PR1  NAME: Carolyn Amaya  : 2000  MRN: [id number]  PCP: No primary care provider on file.    CHIEF COMPLAINT     Pharyngitis (Patient complaining of a sore throat since Tuesday.  States she would like her throat swabbed.  )    HISTORY OF PRESENT ILLNESS        Carolyn Amaya is a 24 y.o. female who presents to the ED via private vehicle with complaint of pharyngitis.  Complaining of a sore throat since Tuesday.  Using Mucinex no relief.  She does have some nasal congestion and rhinorrhea.  No cough.  No fevers.  No chest pain or shortness of breath, no nausea or vomiting or abdominal pain  REVIEW OF SYSTEMS     Pertinent positives and negatives are stated within HPI, all other systems reviewed and are negative.    Past Medical History:  has no past medical history on file.  Surgical History:  has no past surgical history on file.  Social History:  reports that she has never smoked. She has never used smokeless tobacco. She reports that she does not currently use alcohol. She reports that she does not currently use drugs.  Family History: family history is not on file.   Allergies: Patient has no known allergies.  CURRENT MEDICATIONS       There are no discharge medications for this patient.      SCREENINGS     [place] Coma Scale  Eye Opening: Spontaneous  Best Verbal Response: Oriented  Best Motor Response: Obeys commands  [place] Coma Scale Score: 15         CIWA Assessment  BP: 117/60  Pulse: 79       PHYSICAL EXAM   Oxygen Saturation Interpretation: Normal on room air analysis.        ED Triage Vitals [25 0116]   BP Systolic BP Percentile Diastolic BP Percentile Temp Temp src Pulse Respirations SpO2   117/60 -- -- 97.5 °F (36.4 °C) -- 79 16 100 %      Height Weight - Scale         1.6 m (5' 3\") 72.6 kg (160 lb)               Physical